# Patient Record
Sex: FEMALE
[De-identification: names, ages, dates, MRNs, and addresses within clinical notes are randomized per-mention and may not be internally consistent; named-entity substitution may affect disease eponyms.]

---

## 2024-09-12 ENCOUNTER — APPOINTMENT (OUTPATIENT)
Facility: CLINIC | Age: 49
End: 2024-09-12
Payer: SELF-PAY

## 2024-09-12 DIAGNOSIS — Z41.1 ENCOUNTER FOR COSMETIC SURGERY: ICD-10-CM

## 2024-09-12 PROCEDURE — 99499 UNLISTED E&M SERVICE: CPT

## 2024-09-16 PROBLEM — Z41.1 ELECTIVE PROCEDURE FOR UNACCEPTABLE COSMETIC APPEARANCE: Status: ACTIVE | Noted: 2024-09-16

## 2024-09-16 PROBLEM — Z00.00 ENCOUNTER FOR PREVENTIVE HEALTH EXAMINATION: Status: ACTIVE | Noted: 2024-09-16

## 2024-09-16 NOTE — REASON FOR VISIT
[Procedure: _________] : a [unfilled] procedure visit [FreeTextEntry1] : botox and dermal fillers [TextEntry] : Patient is well-known to me from the [facility].  She comes in today requesting cosmetic Botox and Juvederm fillers to nasolabial folds oral commissures and upper lip.  Under aseptic sterile conditions she had 38 units of Botox 12 in the central glabellar area 6 in each crows feet 10 across the upper forehead and 4 in the bunny lines equals 38 units.  Coding for that 760.  She also had Juvederm ultra XC nasolabial fold and right oral commissures and upper lip.  Coding was 500  total fee = 1260.

## 2024-12-10 ENCOUNTER — APPOINTMENT (OUTPATIENT)
Facility: CLINIC | Age: 49
End: 2024-12-10

## 2024-12-12 ENCOUNTER — APPOINTMENT (OUTPATIENT)
Facility: CLINIC | Age: 49
End: 2024-12-12

## 2025-06-10 ENCOUNTER — APPOINTMENT (OUTPATIENT)
Facility: CLINIC | Age: 50
End: 2025-06-10